# Patient Record
(demographics unavailable — no encounter records)

---

## 2025-07-07 NOTE — PHYSICAL EXAM
[Chaperoned Physical Exam] : A chaperone was present in the examining room during all aspects of the physical examination. [MA] : MA [FreeTextEntry2] : Mabel  [Appropriately responsive] : appropriately responsive [Alert] : alert [No Acute Distress] : no acute distress [Soft] : soft [Non-tender] : non-tender [Non-distended] : non-distended [No Lesions] : no lesions [No Mass] : no mass [FreeTextEntry3] : nontender  [Examination Of The Breasts] : a normal appearance [No Masses] : no breast masses were palpable [Labia Majora] : normal [Labia Minora] : normal [No Bleeding] : There was no active vaginal bleeding [Normal] : normal [Uterine Adnexae] : non-palpable [FreeTextEntry8] : Nontender, no CMT, no adnexal masses or fullness appreciated.

## 2025-07-07 NOTE — HISTORY OF PRESENT ILLNESS
[Patient reported PAP Smear was normal] : Patient reported PAP Smear was normal [Normal Amount/Duration] :  normal amount and duration [Frequency: Q ___ days] : menstrual periods occur approximately every [unfilled] days [Menstrual Cramps] : menstrual cramps [Currently Active] : currently active [Men] : men [No] : No [Patient would like to be screened for STIs] : Patient would like to be screened for STIs [PapSmeardate] : 02/2023 [FreeTextEntry1] : 06/15/2025

## 2025-07-07 NOTE — PLAN
[FreeTextEntry1] : Ms. Leung presents for well woman's  exam.   - Vitals reviewed and within normal limits.  - Breast exam, pelvic exam and pap smear performed today.  - Discussed recommendation that patient decide on timeline for pursing pregnancy given advancing age - Referral for   colonoscopy provided.  - Patient preventative health actions reviewed-  encouraged well balanced diet and weight bearing exercise.    . Plan of care discussed with patient who has no additional questions and is in agreement.